# Patient Record
Sex: MALE | Race: WHITE | NOT HISPANIC OR LATINO | Employment: STUDENT | URBAN - METROPOLITAN AREA
[De-identification: names, ages, dates, MRNs, and addresses within clinical notes are randomized per-mention and may not be internally consistent; named-entity substitution may affect disease eponyms.]

---
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
## 2023-03-27 ENCOUNTER — OFFICE VISIT (OUTPATIENT)
Dept: URGENT CARE | Facility: CLINIC | Age: 18
End: 2023-03-27

## 2023-03-27 VITALS
BODY MASS INDEX: 24.25 KG/M2 | HEIGHT: 73 IN | RESPIRATION RATE: 18 BRPM | WEIGHT: 183 LBS | OXYGEN SATURATION: 99 % | TEMPERATURE: 97.1 F | HEART RATE: 62 BPM | DIASTOLIC BLOOD PRESSURE: 74 MMHG | SYSTOLIC BLOOD PRESSURE: 118 MMHG

## 2023-03-27 DIAGNOSIS — J01.00 ACUTE MAXILLARY SINUSITIS, RECURRENCE NOT SPECIFIED: Primary | ICD-10-CM

## 2023-03-27 DIAGNOSIS — J40 BRONCHITIS: ICD-10-CM

## 2023-03-27 RX ORDER — AZITHROMYCIN 250 MG/1
TABLET, FILM COATED ORAL
Qty: 6 TABLET | Refills: 0 | Status: SHIPPED | OUTPATIENT
Start: 2023-03-27 | End: 2023-03-31

## 2023-03-27 RX ORDER — DIPHENOXYLATE HYDROCHLORIDE AND ATROPINE SULFATE 2.5; .025 MG/1; MG/1
TABLET ORAL
COMMUNITY

## 2023-03-27 RX ORDER — BENZONATATE 100 MG/1
100 CAPSULE ORAL 3 TIMES DAILY PRN
Qty: 20 CAPSULE | Refills: 0 | Status: SHIPPED | OUTPATIENT
Start: 2023-03-27

## 2023-03-27 NOTE — PROGRESS NOTES
"[facility]        NAME: Jen Arredondo is a 21 y o  male  : 2005    MRN: [id number]  DATE: 2023  TIME: 10:46 AM    /74   Pulse 62   Temp (!) 97 1 °F (36 2 °C)   Resp 18   Ht 6' 1\" (1 854 m)   Wt 83 kg (183 lb)   SpO2 99%   BMI 24 14 kg/m²     Assessment and Plan   Acute maxillary sinusitis, recurrence not specified [J01 00]  1  Acute maxillary sinusitis, recurrence not specified  benzonatate (TESSALON PERLES) 100 mg capsule    azithromycin (ZITHROMAX) 250 mg tablet      2  Bronchitis  benzonatate (TESSALON PERLES) 100 mg capsule    azithromycin (ZITHROMAX) 250 mg tablet            Patient Instructions       Follow up with PCP in 3-5 days  Proceed to  ER if symptoms worsen  Chief Complaint     Chief Complaint   Patient presents with   • Nasal Congestion     PT present with nasal congestion, ear pain and fullness, runny nose with thick dark green mucous, post nasal drip, and cough x 2 weeks  History of Present Illness       Pt with sinus congestion and pain  producitve cough and congestion for 4-5 days       Review of Systems   Review of Systems   Constitutional: Negative  HENT: Positive for congestion, sinus pressure and sinus pain  Eyes: Negative  Respiratory: Positive for cough  Cardiovascular: Negative  Gastrointestinal: Negative  Endocrine: Negative  Genitourinary: Negative  Musculoskeletal: Negative  Skin: Negative  Allergic/Immunologic: Negative  Neurological: Negative  Hematological: Negative  Psychiatric/Behavioral: Negative  All other systems reviewed and are negative          Current Medications       Current Outpatient Medications:   •  azithromycin (ZITHROMAX) 250 mg tablet, Take 2 tablets today then 1 tablet daily x 4 days, Disp: 6 tablet, Rfl: 0  •  benzonatate (TESSALON PERLES) 100 mg capsule, Take 1 capsule (100 mg total) by mouth 3 (three) times a day as needed for cough, Disp: 20 capsule, Rfl: 0  •  " "fluticasone (FLONASE) 50 mcg/act nasal spray, 1 spray into each nostril daily, Disp: 11 1 mL, Rfl: 0  •  Multiple Vitamin (MULTI-VITAMIN DAILY PO), Take by mouth, Disp: , Rfl:   •  multivitamin (THERAGRAN) TABS, Take by mouth, Disp: , Rfl:     Current Allergies     Allergies as of 03/27/2023   • (No Known Allergies)            The following portions of the patient's history were reviewed and updated as appropriate: allergies, current medications, past family history, past medical history, past social history, past surgical history and problem list      Past Medical History:   Diagnosis Date   • Subaortic stenosis        History reviewed  No pertinent surgical history  No family history on file  Medications have been verified  Objective   /74   Pulse 62   Temp (!) 97 1 °F (36 2 °C)   Resp 18   Ht 6' 1\" (1 854 m)   Wt 83 kg (183 lb)   SpO2 99%   BMI 24 14 kg/m²        Physical Exam     Physical Exam  Vitals and nursing note reviewed  Constitutional:       Appearance: Normal appearance  He is normal weight  HENT:      Head: Normocephalic and atraumatic  Right Ear: Tympanic membrane, ear canal and external ear normal       Left Ear: Tympanic membrane, ear canal and external ear normal       Nose: Congestion and rhinorrhea present  Mouth/Throat:      Mouth: Mucous membranes are moist       Pharynx: Oropharynx is clear  Eyes:      Extraocular Movements: Extraocular movements intact  Conjunctiva/sclera: Conjunctivae normal       Pupils: Pupils are equal, round, and reactive to light  Cardiovascular:      Rate and Rhythm: Normal rate and regular rhythm  Pulses: Normal pulses  Heart sounds: Normal heart sounds  Pulmonary:      Effort: Pulmonary effort is normal       Comments: Minor coarse sounds   Abdominal:      General: Abdomen is flat  Bowel sounds are normal       Palpations: Abdomen is soft  Musculoskeletal:         General: Normal range of motion        " Cervical back: Normal range of motion and neck supple  Skin:     General: Skin is warm  Capillary Refill: Capillary refill takes less than 2 seconds  Neurological:      General: No focal deficit present  Mental Status: He is alert and oriented to person, place, and time     Psychiatric:         Mood and Affect: Mood normal          Behavior: Behavior normal

## 2023-05-03 ENCOUNTER — OFFICE VISIT (OUTPATIENT)
Dept: URGENT CARE | Facility: CLINIC | Age: 18
End: 2023-05-03

## 2023-05-03 VITALS
TEMPERATURE: 98.4 F | BODY MASS INDEX: 24.78 KG/M2 | HEART RATE: 71 BPM | HEIGHT: 73 IN | WEIGHT: 187 LBS | OXYGEN SATURATION: 99 % | RESPIRATION RATE: 16 BRPM

## 2023-05-03 DIAGNOSIS — R30.9 PAINFUL URINATION: Primary | ICD-10-CM

## 2023-05-03 DIAGNOSIS — R30.0 DYSURIA: ICD-10-CM

## 2023-05-03 LAB
SL AMB  POCT GLUCOSE, UA: NEGATIVE
SL AMB LEUKOCYTE ESTERASE,UA: NEGATIVE
SL AMB POCT BILIRUBIN,UA: NEGATIVE
SL AMB POCT BLOOD,UA: NEGATIVE
SL AMB POCT CLARITY,UA: CLEAR
SL AMB POCT COLOR,UA: NORMAL
SL AMB POCT KETONES,UA: NEGATIVE
SL AMB POCT NITRITE,UA: NEGATIVE
SL AMB POCT PH,UA: 5
SL AMB POCT SPECIFIC GRAVITY,UA: 1.02
SL AMB POCT URINE PROTEIN: NEGATIVE
SL AMB POCT UROBILINOGEN: 0.2

## 2023-05-03 NOTE — PROGRESS NOTES
[facility]        NAME: Rocio Garcia is a 19 y o  male  : 2005    MRN: [id number]  DATE: May 3, 2023  TIME: 7:25 PM    Assessment and Plan   Painful urination [R30 9]  1  Painful urination  POCT urine dip    Urine culture    Chlamydia/GC amplified DNA by PCR      2  Dysuria          - Denies need for referral to specialist as he will go [place] one in [place] hometown  - UA negative for leuks, nitrates, and blood  - Pending Gonorrhea + Chlamydia     Patient Instructions   - Continue follow up with specialists in [address] [place] [address] for burning   - Recommend ED if swelling develops or for any concerning or worsening of symptoms    - Pending urine culture  - Pending gonorrhea + chlamydia   Follow up with PCP in 3-5 days  Chief Complaint     Chief Complaint   Patient presents with    Painful Urination     PT presents with painful urination, testicular swelling x 1 month  Pt was seen x 2 months ago and was tested for testicular pain  History of Present Illness       20 y/o M presents for dysuria x 1 month  Pt admits also admits to intermitant testicular swelling  Pt has had this in the past  Has had manual detorsion, doppler US, seen specialist, and recommended pelvic therapy and orchiopexy  About 2 months ago when he was in the ED, he admits one team told him it was torsion while the other said it was not  Denies going to PT  Never worked up for [employer]  Notices the swelling and pain is intermittent  Denies current swelling  Swelling occurs randomly  Last sexual partner was approx two and half months ago  Denies frequency, urgency, or discharge  Review of Systems   Review of Systems   Constitutional: Negative for chills and fever  Genitourinary: Positive for penile discharge, scrotal swelling and testicular pain  Negative for dysuria, flank pain, frequency and urgency           Current Medications       Current Outpatient Medications:     Multiple Vitamin (MULTI-VITAMIN DAILY "PO), Take by mouth, Disp: , Rfl:     benzonatate (TESSALON PERLES) 100 mg capsule, Take 1 capsule (100 mg total) by mouth 3 (three) times a day as needed for cough (Patient not taking: Reported on 5/3/2023), Disp: 20 capsule, Rfl: 0    fluticasone (FLONASE) 50 mcg/act nasal spray, 1 spray into each nostril daily (Patient not taking: Reported on 5/3/2023), Disp: 11 1 mL, Rfl: 0    multivitamin (THERAGRAN) TABS, Take by mouth (Patient not taking: Reported on 5/3/2023), Disp: , Rfl:     Current Allergies     Allergies as of 05/03/2023    (No Known Allergies)            The following portions of the patient's history were reviewed and updated as appropriate: allergies, current medications, past family history, past medical history, past social history, past surgical history and problem list      Past Medical History:   Diagnosis Date    Subaortic stenosis        History reviewed  No pertinent surgical history  No family history on file  Medications have been verified  Objective   Pulse 71   Temp 98 4 °F (36 9 °C)   Resp 16   Ht 6' 1\" (1 854 m)   Wt 84 8 kg (187 lb)   SpO2 99%   BMI 24 67 kg/m²   No LMP for male patient  Physical Exam     Physical Exam  Vitals and nursing note reviewed  Constitutional:       General: He is not in acute distress  Appearance: He is not toxic-appearing  HENT:      Head: Normocephalic and atraumatic  Eyes:      General:         Right eye: No discharge  Left eye: No discharge  Conjunctiva/sclera: Conjunctivae normal    Pulmonary:      Effort: Pulmonary effort is normal    Abdominal:      Tenderness: There is no abdominal tenderness  There is no right CVA tenderness or left CVA tenderness  Neurological:      Mental Status: He is alert     Psychiatric:         Mood and Affect: Mood normal          Behavior: Behavior normal                    "

## 2023-05-04 LAB
BACTERIA UR CULT: NORMAL
C TRACH DNA SPEC QL NAA+PROBE: NEGATIVE
N GONORRHOEA DNA SPEC QL NAA+PROBE: NEGATIVE

## 2023-05-11 ENCOUNTER — TELEPHONE (OUTPATIENT)
Dept: URGENT CARE | Facility: CLINIC | Age: 18
End: 2023-05-11